# Patient Record
Sex: MALE | Race: WHITE | NOT HISPANIC OR LATINO | URBAN - METROPOLITAN AREA
[De-identification: names, ages, dates, MRNs, and addresses within clinical notes are randomized per-mention and may not be internally consistent; named-entity substitution may affect disease eponyms.]

---

## 2020-05-19 ENCOUNTER — INPATIENT (INPATIENT)
Facility: HOSPITAL | Age: 70
LOS: 2 days | Discharge: HOME | End: 2020-05-22
Attending: INTERNAL MEDICINE | Admitting: INTERNAL MEDICINE
Payer: MEDICARE

## 2020-05-19 VITALS
SYSTOLIC BLOOD PRESSURE: 121 MMHG | OXYGEN SATURATION: 100 % | TEMPERATURE: 98 F | DIASTOLIC BLOOD PRESSURE: 59 MMHG | RESPIRATION RATE: 18 BRPM | HEART RATE: 77 BPM

## 2020-05-19 LAB
ALBUMIN SERPL ELPH-MCNC: 4.2 G/DL — SIGNIFICANT CHANGE UP (ref 3.5–5.2)
ALP SERPL-CCNC: 61 U/L — SIGNIFICANT CHANGE UP (ref 30–115)
ALT FLD-CCNC: 8 U/L — SIGNIFICANT CHANGE UP (ref 0–41)
ANION GAP SERPL CALC-SCNC: 13 MMOL/L — SIGNIFICANT CHANGE UP (ref 7–14)
AST SERPL-CCNC: 14 U/L — SIGNIFICANT CHANGE UP (ref 0–41)
BASOPHILS # BLD AUTO: 0.06 K/UL — SIGNIFICANT CHANGE UP (ref 0–0.2)
BASOPHILS NFR BLD AUTO: 0.6 % — SIGNIFICANT CHANGE UP (ref 0–1)
BILIRUB SERPL-MCNC: 0.3 MG/DL — SIGNIFICANT CHANGE UP (ref 0.2–1.2)
BUN SERPL-MCNC: 31 MG/DL — HIGH (ref 10–20)
CALCIUM SERPL-MCNC: 9.1 MG/DL — SIGNIFICANT CHANGE UP (ref 8.5–10.1)
CHLORIDE SERPL-SCNC: 98 MMOL/L — SIGNIFICANT CHANGE UP (ref 98–110)
CO2 SERPL-SCNC: 26 MMOL/L — SIGNIFICANT CHANGE UP (ref 17–32)
CREAT SERPL-MCNC: 1.8 MG/DL — HIGH (ref 0.7–1.5)
D DIMER BLD IA.RAPID-MCNC: 211 NG/ML DDU — SIGNIFICANT CHANGE UP (ref 0–230)
EOSINOPHIL # BLD AUTO: 0.14 K/UL — SIGNIFICANT CHANGE UP (ref 0–0.7)
EOSINOPHIL NFR BLD AUTO: 1.5 % — SIGNIFICANT CHANGE UP (ref 0–8)
GLUCOSE SERPL-MCNC: 142 MG/DL — HIGH (ref 70–99)
HCT VFR BLD CALC: 33.8 % — LOW (ref 42–52)
HGB BLD-MCNC: 10.6 G/DL — LOW (ref 14–18)
IMM GRANULOCYTES NFR BLD AUTO: 0.3 % — SIGNIFICANT CHANGE UP (ref 0.1–0.3)
LACTATE SERPL-SCNC: 1.7 MMOL/L — SIGNIFICANT CHANGE UP (ref 0.7–2)
LIDOCAIN IGE QN: 39 U/L — SIGNIFICANT CHANGE UP (ref 7–60)
LYMPHOCYTES # BLD AUTO: 0.61 K/UL — LOW (ref 1.2–3.4)
LYMPHOCYTES # BLD AUTO: 6.3 % — LOW (ref 20.5–51.1)
MAGNESIUM SERPL-MCNC: 2.3 MG/DL — SIGNIFICANT CHANGE UP (ref 1.8–2.4)
MCHC RBC-ENTMCNC: 25.8 PG — LOW (ref 27–31)
MCHC RBC-ENTMCNC: 31.4 G/DL — LOW (ref 32–37)
MCV RBC AUTO: 82.2 FL — SIGNIFICANT CHANGE UP (ref 80–94)
MONOCYTES # BLD AUTO: 0.72 K/UL — HIGH (ref 0.1–0.6)
MONOCYTES NFR BLD AUTO: 7.5 % — SIGNIFICANT CHANGE UP (ref 1.7–9.3)
NEUTROPHILS # BLD AUTO: 8.07 K/UL — HIGH (ref 1.4–6.5)
NEUTROPHILS NFR BLD AUTO: 83.8 % — HIGH (ref 42.2–75.2)
NRBC # BLD: 0 /100 WBCS — SIGNIFICANT CHANGE UP (ref 0–0)
NT-PROBNP SERPL-SCNC: 1333 PG/ML — HIGH (ref 0–300)
PLATELET # BLD AUTO: 229 K/UL — SIGNIFICANT CHANGE UP (ref 130–400)
POTASSIUM SERPL-MCNC: 3.8 MMOL/L — SIGNIFICANT CHANGE UP (ref 3.5–5)
POTASSIUM SERPL-SCNC: 3.8 MMOL/L — SIGNIFICANT CHANGE UP (ref 3.5–5)
PROT SERPL-MCNC: 7.7 G/DL — SIGNIFICANT CHANGE UP (ref 6–8)
RBC # BLD: 4.11 M/UL — LOW (ref 4.7–6.1)
RBC # FLD: 15.5 % — HIGH (ref 11.5–14.5)
SARS-COV-2 RNA SPEC QL NAA+PROBE: DETECTED
SODIUM SERPL-SCNC: 137 MMOL/L — SIGNIFICANT CHANGE UP (ref 135–146)
TROPONIN T SERPL-MCNC: <0.01 NG/ML — SIGNIFICANT CHANGE UP
WBC # BLD: 9.63 K/UL — SIGNIFICANT CHANGE UP (ref 4.8–10.8)
WBC # FLD AUTO: 9.63 K/UL — SIGNIFICANT CHANGE UP (ref 4.8–10.8)

## 2020-05-19 PROCEDURE — 93289 INTERROG DEVICE EVAL HEART: CPT | Mod: 26

## 2020-05-19 PROCEDURE — 99223 1ST HOSP IP/OBS HIGH 75: CPT

## 2020-05-19 PROCEDURE — 71045 X-RAY EXAM CHEST 1 VIEW: CPT | Mod: 26

## 2020-05-19 PROCEDURE — 99285 EMERGENCY DEPT VISIT HI MDM: CPT

## 2020-05-19 PROCEDURE — 93010 ELECTROCARDIOGRAM REPORT: CPT

## 2020-05-19 RX ORDER — METOPROLOL TARTRATE 50 MG
25 TABLET ORAL DAILY
Refills: 0 | Status: DISCONTINUED | OUTPATIENT
Start: 2020-05-19 | End: 2020-05-22

## 2020-05-19 RX ORDER — METOPROLOL TARTRATE 50 MG
1 TABLET ORAL
Qty: 0 | Refills: 0 | DISCHARGE

## 2020-05-19 RX ORDER — RIVAROXABAN 15 MG-20MG
15 KIT ORAL DAILY
Refills: 0 | Status: DISCONTINUED | OUTPATIENT
Start: 2020-05-19 | End: 2020-05-20

## 2020-05-19 RX ORDER — ATORVASTATIN CALCIUM 80 MG/1
1 TABLET, FILM COATED ORAL
Qty: 0 | Refills: 0 | DISCHARGE

## 2020-05-19 RX ORDER — SACUBITRIL AND VALSARTAN 24; 26 MG/1; MG/1
1 TABLET, FILM COATED ORAL
Qty: 0 | Refills: 0 | DISCHARGE

## 2020-05-19 RX ORDER — SACUBITRIL AND VALSARTAN 24; 26 MG/1; MG/1
1 TABLET, FILM COATED ORAL
Refills: 0 | Status: DISCONTINUED | OUTPATIENT
Start: 2020-05-19 | End: 2020-05-22

## 2020-05-19 RX ORDER — SPIRONOLACTONE 25 MG/1
25 TABLET, FILM COATED ORAL DAILY
Refills: 0 | Status: DISCONTINUED | OUTPATIENT
Start: 2020-05-19 | End: 2020-05-22

## 2020-05-19 RX ORDER — SPIRONOLACTONE 25 MG/1
1 TABLET, FILM COATED ORAL
Qty: 0 | Refills: 0 | DISCHARGE

## 2020-05-19 RX ORDER — AMIODARONE HYDROCHLORIDE 400 MG/1
200 TABLET ORAL
Refills: 0 | Status: DISCONTINUED | OUTPATIENT
Start: 2020-05-19 | End: 2020-05-22

## 2020-05-19 RX ORDER — MEXILETINE HYDROCHLORIDE 150 MG/1
150 CAPSULE ORAL EVERY 8 HOURS
Refills: 0 | Status: DISCONTINUED | OUTPATIENT
Start: 2020-05-19 | End: 2020-05-22

## 2020-05-19 RX ORDER — ATORVASTATIN CALCIUM 80 MG/1
80 TABLET, FILM COATED ORAL AT BEDTIME
Refills: 0 | Status: DISCONTINUED | OUTPATIENT
Start: 2020-05-19 | End: 2020-05-22

## 2020-05-19 RX ORDER — FONDAPARINUX SODIUM 2.5 MG/.5ML
1 INJECTION, SOLUTION SUBCUTANEOUS
Qty: 0 | Refills: 0 | DISCHARGE

## 2020-05-19 RX ADMIN — SACUBITRIL AND VALSARTAN 1 TABLET(S): 24; 26 TABLET, FILM COATED ORAL at 17:24

## 2020-05-19 RX ADMIN — AMIODARONE HYDROCHLORIDE 200 MILLIGRAM(S): 400 TABLET ORAL at 17:24

## 2020-05-19 RX ADMIN — MEXILETINE HYDROCHLORIDE 150 MILLIGRAM(S): 150 CAPSULE ORAL at 14:06

## 2020-05-19 RX ADMIN — MEXILETINE HYDROCHLORIDE 150 MILLIGRAM(S): 150 CAPSULE ORAL at 22:14

## 2020-05-19 RX ADMIN — ATORVASTATIN CALCIUM 80 MILLIGRAM(S): 80 TABLET, FILM COATED ORAL at 22:14

## 2020-05-19 NOTE — H&P ADULT - HISTORY OF PRESENT ILLNESS
71 yo M with PMHx of 71 yo M with PMHx of MI (2005), CAD s/p PCI x2 (2005), defibrillator implantation in 2011 presents with chest discomfort this morning associated with lightheadedness. He is being worked up for some arrhythmia - unknown to patient-  by his EP doctor in [place]. His dosage of amiodarone was recently increased.    In ED, patient was hemodynamically stable, asymptomatic wanting to sign out AMA. Swabbed for COVID, was positive. Seen by EP. 69 yo M with PMHx of MI (2005), CAD s/p PCI x2 (2005), PAF on xarelto, defibrillator implantation in 2011 presents with chest discomfort this morning associated with lightheadedness. He is being worked up for some arrhythmia - unknown to patient-  by his EP doctor in [place]. His dosage of amiodarone was recently increased.    In ED, patient was hemodynamically stable, asymptomatic wanting to sign out AMA. Swabbed for COVID, was positive. Seen by EP - device interrogated, showed NSVT, sustained VT x 20 seconds.  After discussion with his primary cardiologist, patient decided to stay for further work-up. Will admit to tele with possible cath. 69 yo M with PMHx of MI (2005), CAD s/p PCI x2 (2005), PAF on xarelto, AICD implantation in 2011, ?CHF presents with chest discomfort this morning associated with lightheadedness. He is being worked up for some arrhythmia - unknown to patient-  by his EP doctor in [place]. His dosage of amiodarone was recently increased.    In ED, patient was hemodynamically stable, asymptomatic wanting to sign out AMA. Swabbed for COVID, was positive. Seen by EP - device interrogated, showed NSVT, sustained VT x 20 seconds.  After discussion with his primary cardiologist, patient decided to stay for further work-up. Will admit to tele with possible cath.

## 2020-05-19 NOTE — CONSULT NOTE ADULT - SUBJECTIVE AND OBJECTIVE BOX
Patient is a 70y old  Male who presents with a chief complaint of possible AICD shock    HPI: Pt is a 71 yo M with hx of CAD sp stent, AICD ([employer]), PAF on Xarelto came to ED with c/o "not feeling well". Pt reports that he felt "tension" in his chest with mild lightheadedness and sts this is similar to before he was shocked by ICD last January. Pts EP at [facility] and he recently increased the dosage of Amio last week.           PAST MEDICAL & SURGICAL HISTORY:  Pacemaker  HTN (hypertension)                  PREVIOUS DIAGNOSTIC TESTING:      ECHO  FINDINGS:    STRESS  FINDINGS:    CATHETERIZATION  FINDINGS:    ELECTROPHYSIOLOGY STUDY  FINDINGS:    CAROTID ULTRASOUND:  FINDINGS    VENOUS DUPLEX SCAN:  FINDINGS:    CHEST CT PULMONARY ANGIO with IV Contrast:  FINDINGS:    MEDICATIONS  (STANDING):    MEDICATIONS  (PRN):      FAMILY HISTORY:      SOCIAL HISTORY:    CIGARETTES:    ALCOHOL:    Past Surgical History:    Allergies:    Allergy Status Unknown      REVIEW OF SYSTEMS:    CONSTITUTIONAL: No fever, weight loss, chills, shakes, or fatigue  EYES: No eye pain, visual disturbances, or discharge  ENMT:  No difficulty hearing, tinnitus, vertigo; No sinus or throat pain  NECK: No pain or stiffness  BREASTS: No pain, masses, or nipple discharge  RESPIRATORY: No cough, wheezing, hemoptysis, or shortness of breath  CARDIOVASCULAR: No chest pain, dyspnea, palpitations, dizziness, syncope, paroxysmal nocturnal dyspnea, orthopnea, or arm or leg swelling  GASTROINTESTINAL: No abdominal  or epigastric pain, nausea, vomiting, hematemesis, diarrhea, constipation, melena or bright red blood.  GENITOURINARY: No dysuria, nocturia, hematuria, or urinary incontinence  NEUROLOGICAL: No headaches, memory loss, slurred speech, limb weakness, loss of strength, numbness, or tremors  SKIN: No itching, burning, rashes, or lesions   LYMPH NODES: No enlarged glands  ENDOCRINE: No heat or cold intolerance, or hair loss  MUSCULOSKELETAL: No joint pain or swelling, muscle, back, or extremity pain  PSYCHIATRIC: No depression, anxiety, or difficulty sleeping  HEME/LYMPH: No easy bruising or bleeding gums  ALLERY AND IMMUNOLOGIC: No hives or rash.      Vital Signs Last 24 Hrs  T(C): 36.7 (19 May 2020 07:48), Max: 36.7 (19 May 2020 07:48)  T(F): 98 (19 May 2020 07:48), Max: 98 (19 May 2020 07:48)  HR: 77 (19 May 2020 07:48) (77 - 77)  BP: 121/59 (19 May 2020 07:48) (121/59 - 121/59)  BP(mean): --  RR: 18 (19 May 2020 07:48) (18 - 18)  SpO2: 100% (19 May 2020 07:48) (100% - 100%)    PHYSICAL EXAM:        GENERAL: In no apparent distress, well nourished, and hydrated.  HEAD:  Atraumatic, Normocephalic  EYES: EOMI, PERRLA, conjunctiva and sclera clear  ENMT: No tonsillar erythema, exudates, or enlargements; ist mucous membranes, Good dentition, No lesions  NECK: Supple and normal thyroid.  No JVD or carotid bruit.  Carotid pulse is 2+ bilaterally.  HEART: Regular rate and rhythm; No murmurs, rubs, or gallops.  PULMONARY: Clear to auscultation and perfusion.  No rales, wheezing, or rhonchi bilaterally.  ABDOMEN: Soft, Nontender, Nondistended; Bowel sounds present  EXTREMITIES:  2+ Peripheral Pulses, No clubbing, cyanosis, or edema  LYMPH: No lymphadenopathy noted  NEUROLOGICAL: Grossly nonfocal      INTERPRETATION OF TELEMETRY:    ECG:    I&O's Detail      LABS:                        10.6   9.63  )-----------( 229      ( 19 May 2020 08:20 )             33.8     05-19    137  |  98  |  31<H>  ----------------------------<  142<H>  3.8   |  26  |  1.8<H>    Ca    9.1      19 May 2020 08:20  Mg     2.3     05-19    TPro  7.7  /  Alb  4.2  /  TBili  0.3  /  DBili  x   /  AST  14  /  ALT  8   /  AlkPhos  61  05-19    CARDIAC MARKERS ( 19 May 2020 08:20 )  x     / <0.01 ng/mL / x     / x     / x              BNPSerum Pro-Brain Natriuretic Peptide: 1333 pg/mL (05-19 @ 08:20)    I&O's Detail    Daily     Daily     RADIOLOGY & ADDITIONAL STUDIES: Patient is a 70y old  Male who presents with a chief complaint of possible AICD shock    HPI: Pt is a 71 yo M with hx of CAD sp stent, AICD ([employer]), PAF on Xarelto came to ED with c/o "not feeling well". Pt reports that he felt "tension" in his chest with mild lightheadedness and sts this is similar to before he was shocked by ICD last January. Pts EP at [facility] and he recently increased the dosage of Amio last week. Pt did not get shocked by ICD. Pt now feeling better. Denies CP, palpitations, dizziness, SOB or syncope.    PAST MEDICAL & SURGICAL HISTORY:  Pacemaker  HTN (hypertension)      PREVIOUS DIAGNOSTIC TESTING:      ECHO  FINDINGS:    STRESS  FINDINGS:    CATHETERIZATION  FINDINGS:    ELECTROPHYSIOLOGY STUDY  FINDINGS:    CAROTID ULTRASOUND:  FINDINGS    VENOUS DUPLEX SCAN:  FINDINGS:    CHEST CT PULMONARY ANGIO with IV Contrast:  FINDINGS:    MEDICATIONS  (STANDING):    FAMILY HISTORY: No significant family hx    SOCIAL HISTORY: No smoking, ETOH or illicit drug use    Past Surgical History: AICD Implant    Allergies:  Allergy Status Unknown      REVIEW OF SYSTEMS:  CONSTITUTIONAL: No fever, weight loss, chills, shakes, or fatigue  RESPIRATORY: No cough, wheezing, hemoptysis, or shortness of breath  CARDIOVASCULAR: No chest pain, dyspnea, palpitations, dizziness, syncope, paroxysmal nocturnal dyspnea, orthopnea, or arm or leg swelling  GASTROINTESTINAL: No abdominal  or epigastric pain, nausea, vomiting, hematemesis, diarrhea, constipation, melena or bright red blood.  NEUROLOGICAL: No headaches, memory loss, slurred speech, limb weakness, loss of strength, numbness, or tremors  MUSCULOSKELETAL: No joint pain or swelling, muscle, back, or extremity pain      Vital Signs Last 24 Hrs  T(C): 36.7 (19 May 2020 07:48), Max: 36.7 (19 May 2020 07:48)  T(F): 98 (19 May 2020 07:48), Max: 98 (19 May 2020 07:48)  HR: 77 (19 May 2020 07:48) (77 - 77)  BP: 121/59 (19 May 2020 07:48) (121/59 - 121/59)  BP(mean): --  RR: 18 (19 May 2020 07:48) (18 - 18)  SpO2: 100% (19 May 2020 07:48) (100% - 100%)    PHYSICAL EXAM:  GENERAL: In no apparent distress, well nourished, and hydrated.  HEAD:  Atraumatic, Normocephalic  EYES: EOMI, PERRLA, conjunctiva and sclera clear  ENMT: No tonsillar erythema, exudates, or enlargements; Moist mucous membranes, Good dentition, No lesions  NECK: Supple and normal thyroid.  No JVD or carotid bruit.  Carotid pulse is 2+ bilaterally.  HEART: Regular rate and rhythm; No murmurs, rubs, or gallops.  PULMONARY: Clear to auscultation and perfusion.  No rales, wheezing, or rhonchi bilaterally.  ABDOMEN: Soft, Nontender, Nondistended; Bowel sounds present  EXTREMITIES:  2+ Peripheral Pulses, No clubbing, cyanosis, or edema  NEUROLOGICAL: Grossly nonfocal      INTERPRETATION OF TELEMETRY: Paced Rhythm 68 bpm    ECG:  < from: 12 Lead ECG (05.19.20 @ 07:53) >  Ventricular Rate 70 BPM    Atrial Rate 36 BPM    QRS Duration 208 ms    Q-T Interval 510 ms    QTC Calculation(Bezet) 550 ms    R Axis -59 degrees    T Axis 107 degrees    Diagnosis Line AV dual-paced rhythm  Abnormal ECG    Confirmed by DELMAR DELA CRUZ MD ([id number]) on 5/19/2020 10:19:32 AM          I&O's Detail      LABS:                        10.6   9.63  )-----------( 229      ( 19 May 2020 08:20 )             33.8     05-19    137  |  98  |  31<H>  ----------------------------<  142<H>  3.8   |  26  |  1.8<H>    Ca    9.1      19 May 2020 08:20  Mg     2.3     05-19    TPro  7.7  /  Alb  4.2  /  TBili  0.3  /  DBili  x   /  AST  14  /  ALT  8   /  AlkPhos  61  05-19    CARDIAC MARKERS ( 19 May 2020 08:20 )  x     / <0.01 ng/mL / x     / x     / x              BNPSerum Pro-Brain Natriuretic Peptide: 1333 pg/mL (05-19 @ 08:20)    I&O's Detail    Daily     Daily     RADIOLOGY & ADDITIONAL STUDIES: Patient is a 70y old  Male who presents with a chief complaint of possible AICD shock    HPI: Pt is a 69 yo M with hx of CAD sp stent, AICD ([employer]), PAF on Xarelto came to ED with c/o "not feeling well". Pt reports that he felt "tension" in his chest with mild lightheadedness and sts this is similar to before he was shocked by ICD last January. Pts EP at [facility] and he recently increased the dosage of Amio last week. Pt did not get shocked by ICD. Pt now feeling better. Denies CP, palpitations, dizziness, SOB or syncope.    PAST MEDICAL & SURGICAL HISTORY:  ICD  HTN (hypertension)    PREVIOUS DIAGNOSTIC TESTING:    Cath 2 years ago with PCI to LAD at [facility]    MEDICATIONS  (STANDING):    FAMILY HISTORY: No significant family hx    SOCIAL HISTORY: No smoking, ETOH or illicit drug use    Past Surgical History: AICD Implant    Allergies:  Allergy Status Unknown      REVIEW OF SYSTEMS:  CONSTITUTIONAL: No fever, weight loss, chills, shakes, or fatigue  RESPIRATORY: No cough, wheezing, hemoptysis, or shortness of breath  CARDIOVASCULAR: No chest pain, dyspnea, palpitations, dizziness, syncope, paroxysmal nocturnal dyspnea, orthopnea, or arm or leg swelling  GASTROINTESTINAL: No abdominal  or epigastric pain, nausea, vomiting, hematemesis, diarrhea, constipation, melena or bright red blood.  NEUROLOGICAL: No headaches, memory loss, slurred speech, limb weakness, loss of strength, numbness, or tremors  MUSCULOSKELETAL: No joint pain or swelling, muscle, back, or extremity pain      Vital Signs Last 24 Hrs  T(C): 36.7 (19 May 2020 07:48), Max: 36.7 (19 May 2020 07:48)  T(F): 98 (19 May 2020 07:48), Max: 98 (19 May 2020 07:48)  HR: 77 (19 May 2020 07:48) (77 - 77)  BP: 121/59 (19 May 2020 07:48) (121/59 - 121/59)  BP(mean): --  RR: 18 (19 May 2020 07:48) (18 - 18)  SpO2: 100% (19 May 2020 07:48) (100% - 100%)    PHYSICAL EXAM:  GENERAL: In no apparent distress, well nourished, and hydrated.  HEAD:  Atraumatic, Normocephalic  EYES: EOMI, PERRLA, conjunctiva and sclera clear  ENMT: Moist mucous membranes, Good dentition, No lesions  NECK: Supple and normal thyroid.  No JVD  HEART: Regular rate and rhythm; No murmurs, rubs, or gallops.  PULMONARY: Clear to auscultation and perfusion.  No rales, wheezing, or rhonchi bilaterally.  ABDOMEN: Soft, Nontender, Nondistended; Bowel sounds present  EXTREMITIES:  2+ Peripheral Pulses, No clubbing, cyanosis, or edema  NEUROLOGICAL: Grossly nonfocal      INTERPRETATION OF TELEMETRY: Paced Rhythm 68 bpm    ECG:  < from: 12 Lead ECG (05.19.20 @ 07:53) >  Ventricular Rate 70 BPM    Atrial Rate 36 BPM    QRS Duration 208 ms    Q-T Interval 510 ms    QTC Calculation(Bezet) 550 ms    R Axis -59 degrees    T Axis 107 degrees    Diagnosis Line AV dual-paced rhythm  Abnormal ECG    Confirmed by DELMAR DELA CRUZ MD [id number]) on 5/19/2020 10:19:32 AM      I&O's Detail      LABS:                        10.6   9.63  )-----------( 229      ( 19 May 2020 08:20 )             33.8     05-19    137  |  98  |  31<H>  ----------------------------<  142<H>  3.8   |  26  |  1.8<H>    Ca    9.1      19 May 2020 08:20  Mg     2.3     05-19    TPro  7.7  /  Alb  4.2  /  TBili  0.3  /  DBili  x   /  AST  14  /  ALT  8   /  AlkPhos  61  05-19    CARDIAC MARKERS ( 19 May 2020 08:20 )  x     / <0.01 ng/mL / x     / x     / x              BNPSerum Pro-Brain Natriuretic Peptide: 1333 pg/mL (05-19 @ 08:20)    I&O's Detail    Daily     Daily     RADIOLOGY & ADDITIONAL STUDIES:

## 2020-05-19 NOTE — CONSULT NOTE ADULT - ATTENDING COMMENTS
EP: Dr. Schmidt    69 yo M with history of CAD s/p PCI (LAD 2 years ago), BiV ICD, paroxysmal AFib on Xarelto and Amio. Pt with recent episodes of VT but has been refusing ablation. He presented to the ED c/o "not feeling well." Interrogation revealed 2 episodes of NSVT (17 sec and 20 sec). He received ATP for one of the episodes. Pt denied chest pain.     Recommend  - Cont Amio. Rec starting BB and Mexiletine  - Monitor on tele  - Ischemic work up per cardiology  - Rec checking 2D echo and trending troponins  - ID consult for asymptomatic COVID + positive  - Monitor lytes and keep K>4 and Mg>2

## 2020-05-19 NOTE — ED ADULT NURSE NOTE - OBJECTIVE STATEMENT
Patient c/o pacemaker malfunction, patient states he felt the sensation of his pacemaker about to go off, patient had palpitations. Patient states after episode he feels better, denies nausea / vomiting /palpitations/ SOB/diaphoresis at this time. On assessment no s/s of distress noted. CM in place.

## 2020-05-19 NOTE — H&P ADULT - NSHPLABSRESULTS_GEN_ALL_CORE
LABS:                          10.6   9.63  )-----------( 229      ( 19 May 2020 08:20 )             33.8     05-19    137  |  98  |  31<H>  ----------------------------<  142<H>  3.8   |  26  |  1.8<H>    Ca    9.1      19 May 2020 08:20  Mg     2.3     05-19    TPro  7.7  /  Alb  4.2  /  TBili  0.3  /  DBili  x   /  AST  14  /  ALT  8   /  AlkPhos  61  05-19        < from: Xray Chest 1 View- PORTABLE-Urgent (05.19.20 @ 08:39) >    Impression:    No radiographic evidence of acute cardiopulmonary disease.  Mild bibasilar atelectasis     < end of copied text >

## 2020-05-19 NOTE — ED PROVIDER NOTE - NS ED ROS FT
Constitutional: (-) fever  Eyes/ENT: (-) blurry vision, (-) epistaxis  Cardiovascular: +) chest pain, (-) syncope  Respiratory: (-) cough, (-) shortness of breath  Gastrointestinal: (-) vomiting, (-) diarrhea  Musculoskeletal: (-) neck pain, (-) back pain, (-) joint pain  Integumentary: (-) rash, (-) edema  Neurological: (-) headache, (-) altered mental status  Psychiatric: (-) hallucinations  Allergic/Immunologic: (-) pruritus

## 2020-05-19 NOTE — ED PROVIDER NOTE - CARE PLAN
Principal Discharge DX:	Defibrillator discharge  Secondary Diagnosis:	SVT (supraventricular tachycardia)

## 2020-05-19 NOTE — H&P ADULT - NSHPPHYSICALEXAM_GEN_ALL_CORE
PHYSICAL EXAM:    GENERAL: NAD, lying in bed comfortably  HEAD:  Atraumatic, Normocephalic  EYES: EOMI, PERRLA, conjunctiva and sclera clear  ENT: Moist mucous membranes  NECK: Supple, No JVD  CHEST/LUNG: Clear to auscultation bilaterally; No rales, rhonchi, wheezing, or rubs. Unlabored respirations  HEART: Regular rate and rhythm; No murmurs, rubs, or gallops  ABDOMEN: Bowel sounds present; Soft, Nontender, Nondistended. No hepatomegally  EXTREMITIES:  2+ Peripheral Pulses, brisk capillary refill. No clubbing, cyanosis, or edema  NERVOUS SYSTEM:  Alert & Oriented X3, speech clear. No deficits   MSK: FROM all 4 extremities, full and equal strength  SKIN: No rashes or lesions PHYSICAL EXAM:    GENERAL: NAD, sitting up in bed   HEAD:  Atraumatic, Normocephalic  EYES: EOMI, PERRLA, conjunctiva and sclera clear  ENT: Moist mucous membranes  NECK: Supple, No JVD  CHEST/LUNG: Clear to auscultation bilaterally; Unlabored respirations  HEART: Regular rate and rhythm; No murmurs, rubs, or gallops  ABDOMEN: Bowel sounds present; Soft, Nontender, Nondistended  EXTREMITIES:  2+ Peripheral Pulses, brisk capillary refill. No edema  NERVOUS SYSTEM:  Alert & Oriented X3, speech clear. No deficits

## 2020-05-19 NOTE — ED PROVIDER NOTE - OBJECTIVE STATEMENT
69y/o M w/ hx of HTN, CHF, defibrillator for the past one being worked up for some "arrhthymias" by dr. gallegos in [place] presents for chest discomfort this morning followed by a shock of his defibrillator.    [employer] defibrillator  reached to dr. owen who was aware of this pt by pts cardio.  wants admission to cath.  pt denies cp now, no sob, no vomiting or diarrhea. no cough, congeisotn, runny nose.

## 2020-05-19 NOTE — H&P ADULT - ASSESSMENT
#COVID-19 positive  -CXR - bibasilar atelectasis, encourage incentive spirometry   -no signs of respiratory distress  -saturating well on room air   -supportive care, keep O2 sat 92-96%  -f/u inflammatory markers (procalcitonin, ferritin, CRP, D-dimer)     GI ppx: not indicated   DVT ppx: heparin subq     Activity: increase as tolerated 71 yo M with PMHx of MI (2005), CAD s/p PCI x2 (2005), defibrillator implantation in 2011 presents with chest discomfort this morning associated with lightheadedness.     #NSVT, sustained VT x 20 seconds  - Admit to tele   - Cont Amio 200 mg Q12h  - Start Mexiletine 150mg Q8h & lopressor 25 mg QD  - f/u 2D Echo  - Cardiology consult for ischemic workup  - Daily EKG to assess QTc  - Monitor electrolytes, maintain WNL  - EP following     #COVID-19 positive, asymptomatic   -CXR - bibasilar atelectasis, encourage incentive spirometry   -no signs of respiratory distress  -saturating well on room air   -supportive care, keep O2 sat 92-96%  -f/u inflammatory markers (procalcitonin, ferritin, CRP, D-dimer)     GI ppx: not indicated   DVT ppx: heparin subq     Activity: increase as tolerated 69 yo M with PMHx of MI (2005), CAD s/p PCI x2 (2005), defibrillator implantation in 2011 presents with chest discomfort this morning associated with lightheadedness.     #Chest discomfort 2/2 NSVT, sustained VT x 20 seconds   - EP interrogated device - NSVT, sustained VT x20 seconds   - Admit to tele   - Cont Amio 200 mg Q12h (patient was supposed to go from amio 200 BID to 200 QD on 5/20)  - Start Mexiletine 150mg Q8h   - f/u 2D Echo to access for HF   - f/u cardio consult for ischemic workup  - Daily EKG to assess QTc - AM EKG ordered   - Monitor electrolytes - Mg >2, K >4   - EP following     #Paroxysmal Afib, rate-controlled   -continue xarelto, metoprolol    #CAD s/p PCIx2   - continue xarelto, atorvastatin, BB, entresto     #COVID-19 positive, asymptomatic   -CXR - bibasilar atelectasis, encourage incentive spirometry   -no signs of respiratory distress  -saturating well on room air   -supportive care, keep O2 sat 92-96%  -f/u inflammatory markers (procalcitonin, ferritin, CRP, D-dimer)     GI ppx: not indicated   DVT ppx: xarelto     Activity: increase as tolerated   Diet: DASH/TLC   Dispo: Home 71 yo M with PMHx of MI (2005), CAD s/p PCI x2 (2005), defibrillator implantation in 2011 presents with chest discomfort this morning associated with lightheadedness.     #Chest discomfort 2/2 NSVT, sustained VT x 20 seconds   - EP interrogated device - NSVT, sustained VT x20 seconds   - Admit to tele   - Cont Amio 200 mg Q12h (patient was supposed to go from amio 200 BID to 200 QD on 5/20)  - Start Mexiletine 150mg Q8h   - f/u 2D Echo to access for HF   - f/u cardio consult for ischemic workup  - Daily EKG to assess QTc - AM EKG ordered   - Monitor electrolytes - Mg >2, K >4   - EP following     #?HF unknown if reserved or preserved EF   -no echo in chart, f/u repeat 2D echo   -continue entresto, spironolactone torsemide, metoprolol     #Paroxysmal Afib, rate-controlled   -continue xarelto, metoprolol    #CAD s/p PCIx2   - continue xarelto, atorvastatin, BB, entresto     #COVID-19 positive, asymptomatic   -CXR - bibasilar atelectasis, encourage incentive spirometry   -no signs of respiratory distress  -saturating well on room air   -supportive care, keep O2 sat 92-96%  -f/u inflammatory markers (procalcitonin, ferritin, CRP, D-dimer)     GI ppx: not indicated   DVT ppx: xarelto     Activity: increase as tolerated   Diet: DASH/TLC   Dispo: Home

## 2020-05-19 NOTE — H&P ADULT - ATTENDING COMMENTS
Patient seen and examined independently. Agree with resident note/ history / physical exam and plan of care with following exceptions/additions/updates. Case discussed with patient/pt decision maker, house-staff and nursing    pt is feeling ok now.   dw EP, pt needs ischemic workup per EP. will follow up cardiology consult.

## 2020-05-19 NOTE — ED ADULT TRIAGE NOTE - CHIEF COMPLAINT QUOTE
Pt states he felt his pacemaker/defibrillator was going to go off, stating he felt palpitations and mild chest pain, but then he felt better.

## 2020-05-19 NOTE — ED PROVIDER NOTE - PHYSICAL EXAMINATION
VITAL SIGNS: I have reviewed nursing notes and confirm.  CONSTITUTIONAL: Well-developed; well-nourished; in no acute distress. pt comfortable.  SKIN: skin exam is warm and dry, no acute rash.   HEAD: Normocephalic; atraumatic.  EYES:  EOM intact; conjunctiva and sclera clear.  ENT: No nasal discharge; airway clear. moist oral mucosa;   NECK: Supple; non tender.  CARD: S1, S2 normal; no murmurs, gallops, or rubs. Regular rate and rhythm. posterior tibial and radial pulses 2+  RESP: No wheezes, rales or rhonchi. cta b/l. no use of accessory muscles. no retractions  ABD: Normal bowel sounds; soft; non-distended; non-tender; no rebound. negative psoas, rovsign's and murphys.  EXT: Normal ROM. No  cyanosis or edema.  BACK: No cva tenderness  NEURO: Alert, oriented, grossly unremarkable.    PSYCH: Cooperative, appropriate.

## 2020-05-19 NOTE — ED ADULT NURSE NOTE - CHPI ED NUR SYMPTOMS NEG
no fever/no congestion/no syncope/no shortness of breath/no chest pain/no back pain/no chills/no vomiting/no dizziness/no diaphoresis

## 2020-05-19 NOTE — ED PROVIDER NOTE - CLINICAL SUMMARY MEDICAL DECISION MAKING FREE TEXT BOX
Interrogated by EP. Had two episodes of non-sustained VT terminated with overdrive pacing. Will continue amiodarone. Electrolytes WNL. Tn negative. Will admit to tele.  Rapid covid +. no symptoms. Will isolate.

## 2020-05-20 LAB
ANION GAP SERPL CALC-SCNC: 12 MMOL/L — SIGNIFICANT CHANGE UP (ref 7–14)
BUN SERPL-MCNC: 26 MG/DL — HIGH (ref 10–20)
CALCIUM SERPL-MCNC: 9.2 MG/DL — SIGNIFICANT CHANGE UP (ref 8.5–10.1)
CHLORIDE SERPL-SCNC: 101 MMOL/L — SIGNIFICANT CHANGE UP (ref 98–110)
CO2 SERPL-SCNC: 27 MMOL/L — SIGNIFICANT CHANGE UP (ref 17–32)
CREAT SERPL-MCNC: 1.8 MG/DL — HIGH (ref 0.7–1.5)
CRP SERPL-MCNC: 0.87 MG/DL — HIGH (ref 0–0.4)
GLUCOSE SERPL-MCNC: 99 MG/DL — SIGNIFICANT CHANGE UP (ref 70–99)
HCV AB S/CO SERPL IA: 0.05 COI — SIGNIFICANT CHANGE UP
HCV AB SERPL-IMP: SIGNIFICANT CHANGE UP
MAGNESIUM SERPL-MCNC: 2.5 MG/DL — HIGH (ref 1.8–2.4)
POTASSIUM SERPL-MCNC: 4.5 MMOL/L — SIGNIFICANT CHANGE UP (ref 3.5–5)
POTASSIUM SERPL-SCNC: 4.5 MMOL/L — SIGNIFICANT CHANGE UP (ref 3.5–5)
PROCALCITONIN SERPL-MCNC: 0.06 NG/ML — SIGNIFICANT CHANGE UP (ref 0.02–0.1)
SODIUM SERPL-SCNC: 140 MMOL/L — SIGNIFICANT CHANGE UP (ref 135–146)

## 2020-05-20 PROCEDURE — 93010 ELECTROCARDIOGRAM REPORT: CPT

## 2020-05-20 PROCEDURE — 99233 SBSQ HOSP IP/OBS HIGH 50: CPT

## 2020-05-20 RX ORDER — SODIUM CHLORIDE 9 MG/ML
1000 INJECTION INTRAMUSCULAR; INTRAVENOUS; SUBCUTANEOUS
Refills: 0 | Status: DISCONTINUED | OUTPATIENT
Start: 2020-05-20 | End: 2020-05-21

## 2020-05-20 RX ORDER — CLOPIDOGREL BISULFATE 75 MG/1
75 TABLET, FILM COATED ORAL DAILY
Refills: 0 | Status: DISCONTINUED | OUTPATIENT
Start: 2020-05-21 | End: 2020-05-22

## 2020-05-20 RX ORDER — AMIODARONE HYDROCHLORIDE 400 MG/1
1 TABLET ORAL
Qty: 0 | Refills: 0 | DISCHARGE
Start: 2020-05-20

## 2020-05-20 RX ORDER — ASPIRIN/CALCIUM CARB/MAGNESIUM 324 MG
81 TABLET ORAL DAILY
Refills: 0 | Status: DISCONTINUED | OUTPATIENT
Start: 2020-05-20 | End: 2020-05-22

## 2020-05-20 RX ORDER — CHLORHEXIDINE GLUCONATE 213 G/1000ML
1 SOLUTION TOPICAL
Refills: 0 | Status: DISCONTINUED | OUTPATIENT
Start: 2020-05-20 | End: 2020-05-22

## 2020-05-20 RX ORDER — AMIODARONE HYDROCHLORIDE 400 MG/1
1 TABLET ORAL
Qty: 0 | Refills: 0 | DISCHARGE

## 2020-05-20 RX ADMIN — SACUBITRIL AND VALSARTAN 1 TABLET(S): 24; 26 TABLET, FILM COATED ORAL at 05:43

## 2020-05-20 RX ADMIN — ATORVASTATIN CALCIUM 80 MILLIGRAM(S): 80 TABLET, FILM COATED ORAL at 21:14

## 2020-05-20 RX ADMIN — Medication 81 MILLIGRAM(S): at 14:06

## 2020-05-20 RX ADMIN — MEXILETINE HYDROCHLORIDE 150 MILLIGRAM(S): 150 CAPSULE ORAL at 05:43

## 2020-05-20 RX ADMIN — SODIUM CHLORIDE 75 MILLILITER(S): 9 INJECTION INTRAMUSCULAR; INTRAVENOUS; SUBCUTANEOUS at 21:14

## 2020-05-20 RX ADMIN — MEXILETINE HYDROCHLORIDE 150 MILLIGRAM(S): 150 CAPSULE ORAL at 14:07

## 2020-05-20 RX ADMIN — Medication 25 MILLIGRAM(S): at 05:43

## 2020-05-20 RX ADMIN — AMIODARONE HYDROCHLORIDE 200 MILLIGRAM(S): 400 TABLET ORAL at 17:46

## 2020-05-20 RX ADMIN — SACUBITRIL AND VALSARTAN 1 TABLET(S): 24; 26 TABLET, FILM COATED ORAL at 17:46

## 2020-05-20 RX ADMIN — AMIODARONE HYDROCHLORIDE 200 MILLIGRAM(S): 400 TABLET ORAL at 05:43

## 2020-05-20 RX ADMIN — SPIRONOLACTONE 25 MILLIGRAM(S): 25 TABLET, FILM COATED ORAL at 05:43

## 2020-05-20 RX ADMIN — Medication 100 MILLIGRAM(S): at 05:43

## 2020-05-20 RX ADMIN — MEXILETINE HYDROCHLORIDE 150 MILLIGRAM(S): 150 CAPSULE ORAL at 21:14

## 2020-05-20 NOTE — CONSULT NOTE ADULT - ASSESSMENT
69 yo M with PMHx of MI (2005), CAD s/p PCI x2 (2005), PAF on xarelto, AICD implantation in 2011, ?CHF presents with chest discomfort associated with lightheadedness. pt was found to have NSVT on device interrogation. Cardiology was consulted for ischemic work up. Pt was found to be COVID +ve but he denies any pertinent symptoms recently.     NSVT x 2 (one of them terminated by ATP)  denies any anginal symptoms at baseline   elevated Cr- ? ROSALEE and CKD   COVID +ve- asymptomatic   h/o CAD s/p PCI 15 yrs ago   h/o Afib on Xarelto, BIV ICD    Plan:  start ASA  cont statin BB, entresto,  diuretics   obtain baseline Cr level from PMD  hold Xarelto for now   f/u ID eval    will d/w attending regarding cath
Assessment: 69 yo M with CAD sp stent and AICD admitted for lightheadedness and not feeling well. Pt reports feeling sx similar to BEFORE his AICD had shocked him but was never shocked. Pt now asymptomatic    Impression:  NSVT  Sustained VT on Interrogation x 20 sec (terminated by ATP)  CAD sp Stent  Covid +    Plan:  - Admit to tele  - Cont Amio 200 mg Q12h  - Start Mexiletine 150mg Q8h  - Start Lopressor 25 mg QD  - 2D Echo  - Cardiology consult for ischemic workup  - Daily EKG to assess QTc  - Cont tele monitoring  - Treat COVID as per ID  - Monitor electrolytes, maintain WNL  - Will cont to follow

## 2020-05-20 NOTE — CHART NOTE - NSCHARTNOTEFT_GEN_A_CORE
PRE-OP DIAGNOSIS: monomorphic VTACH    PROCEDURE: [facility] with coronary angiography    Physician: Megha  Assistant: Meir Davila    ANESTHESIA TYPE:  [  ]General Anesthesia  [ x ] Sedation  [x  ] Local/Regional    ESTIMATED BLOOD LOSS:    10   mL    CONDITION  [  ] Critical  [  ] Serious  [  ]Fair  [x  ]Good      SPECIMENS REMOVED (IF APPLICABLE): N/A      IV CONTRAST:    200         mL      IMPLANTS (IF APPLICABLE)      FINDINGS    Left Heart Catheterization:  LVEF%:  LVEDP: minimal elevation  [ ] Normal Coronary Arteries  [ ] Luminal Irregularities  [ ] Non-obstructive CAD      LEFT HEART CATHETERIZATION                                    Left main normal    LAD:    mid 70% . proximal mild in stent restenosis. iFR 0.88                    Diag:    Left Circumflex: mild diffuse disease  OM:    Right Coronary Artery: mid 80% lesion.   RPDA  RPL    Ramus Intermed:    DOMINANCE: Right    ACCESS: r radial  CLOSURE: d stat    INTERVENTION  IMPLANTS: ANYA x2 in mRCA, ANYA x1 in mLAD    POST-OP DIAGNOSIS: VTACH, CAD          PLAN OF CARE  [ ] D/C Home today  [ ]  D/C in AM  [x ] Return to In-patient bed  [ ] Admit for observation  [ ] Return for staged procedure:  [ ] CT Surgery consult called  [x ]  Continue DAPT, B-blocker & Statin therapy    Results of procedure/ plan of care discussed with patient/  in detail. PRE-OP DIAGNOSIS: monomorphic VTACH    PROCEDURE: [facility] with coronary angiography    Physician: Megha  Assistant: Meir Davila    ANESTHESIA TYPE:  [  ]General Anesthesia  [ x ] Sedation  [x  ] Local/Regional    ESTIMATED BLOOD LOSS:    10   mL    CONDITION  [  ] Critical  [  ] Serious  [  ]Fair  [x  ]Good      SPECIMENS REMOVED (IF APPLICABLE): N/A      IV CONTRAST:    200         mL      IMPLANTS (IF APPLICABLE)      FINDINGS    Left Heart Catheterization:  LVEF%:  LVEDP: minimal elevation  [ ] Normal Coronary Arteries  [ ] Luminal Irregularities  [ ] Non-obstructive CAD      LEFT HEART CATHETERIZATION                                    Left main normal    LAD:    mid 70% . proximal mild in stent restenosis. iFR 0.88                    Diag:    Left Circumflex: mild diffuse disease  OM:    Right Coronary Artery: mid 80% lesion.   RPDA  RPL    Ramus Intermed:    DOMINANCE: Right    ACCESS: r radial  CLOSURE: d stat    INTERVENTION  IMPLANTS: ANYA x2 in mRCA, ANYA x1 in mLAD    POST-OP DIAGNOSIS: VTACH, CAD    Continus aspirin and xarelto.   add plavix.  In one week discontinue aspirin and continue plavix and xarelto indefinitely.       PLAN OF CARE  [ ] D/C Home today  [ ]  D/C in AM  [x ] Return to In-patient bed  [ ] Admit for observation  [ ] Return for staged procedure:  [ ] CT Surgery consult called  [x ]  Continue DAPT, B-blocker & Statin therapy    Results of procedure/ plan of care discussed with patient/  in detail.

## 2020-05-20 NOTE — CONSULT NOTE ADULT - SUBJECTIVE AND OBJECTIVE BOX
Patient is a 70y old  Male who presents with a chief complaint of pacemaker malfunction (20 May 2020 08:40)    HPI:  69 yo M with PMHx of MI (2005), CAD s/p PCI x2 (2005), PAF on xarelto, AICD implantation in 2011, ?CHF presents with chest discomfort this morning associated with lightheadedness. He is being worked up for some arrhythmia - unknown to patient-  by his EP doctor in [place]. His dosage of amiodarone was recently increased.    In ED, patient was hemodynamically stable, asymptomatic wanting to sign out AMA. Swabbed for COVID, was positive. Seen by EP - device interrogated, showed NSVT, sustained VT x 20 seconds.  After discussion with his primary cardiologist, patient decided to stay for further work-up. Will admit to tele with possible cath. (19 May 2020 10:38)      ROS:  All other systems reviewed and are negative    PAST MEDICAL & SURGICAL HISTORY  Pacemaker  HTN (hypertension)      FAMILY HISTORY:  FAMILY HISTORY:  NC    SOCIAL HISTORY:  [-]smoker  []Alcohol  []Drug    ALLERGIES:  Allergy Status Unknown      MEDICATIONS:  MEDICATIONS  (STANDING):  aMIOdarone    Tablet 200 milliGRAM(s) Oral two times a day  atorvastatin 80 milliGRAM(s) Oral at bedtime  chlorhexidine 4% Liquid 1 Application(s) Topical <User Schedule>  metoprolol succinate ER 25 milliGRAM(s) Oral daily  mexiletine 150 milliGRAM(s) Oral every 8 hours  sacubitril 24 mG/valsartan 26 mG 1 Tablet(s) Oral two times a day  spironolactone 25 milliGRAM(s) Oral daily  torsemide 100 milliGRAM(s) Oral daily    MEDICATIONS  (PRN):      HOME MEDICATIONS:  Home Medications:  amiodarone 200 mg oral tablet: 1 tab(s) orally once a day (19 May 2020 11:45)  amiodarone 200 mg oral tablet: 1 tab(s) orally 2 times a day (19 May 2020 11:45)  atorvastatin 80 mg oral tablet: 1 tab(s) orally once a day (19 May 2020 11:46)  Entresto 24 mg-26 mg oral tablet: 1 tab(s) orally 2 times a day (19 May 2020 11:47)  metoprolol succinate 25 mg oral capsule, extended release: 1 cap(s) orally once a day (19 May 2020 11:46)  spironolactone 25 mg oral tablet: 1 tab(s) orally once a day (19 May 2020 11:46)  torsemide 100 mg oral tablet: 1 tab(s) orally once a day (19 May 2020 11:46)  Xarelto 15 mg oral tablet: 1 tab(s) orally once a day (in the evening) (19 May 2020 11:46)      VITALS:   T(F): 96.8 (05-20 @ 04:49), Max: 98.1 (05-19 @ 21:15)  HR: 70 (05-20 @ 04:49) (58 - 77)  BP: 104/58 (05-20 @ 04:49) (102/59 - 127/58)  BP(mean): --  RR: 18 (05-20 @ 04:49) (18 - 18)  SpO2: 98% (05-19 @ 21:24) (98% - 100%)    I&O's Summary    19 May 2020 07:01  -  20 May 2020 07:00  --------------------------------------------------------  IN: 200 mL / OUT: 1925 mL / NET: -1725 mL    20 May 2020 07:01  -  20 May 2020 11:31  --------------------------------------------------------  IN: 330 mL / OUT: 1300 mL / NET: -970 mL      LABS:                        10.6   9.63  )-----------( 229      ( 19 May 2020 08:20 )             33.8     05-19    137  |  98  |  31<H>  ----------------------------<  142<H>  3.8   |  26  |  1.8<H>    Ca    9.1      19 May 2020 08:20  Mg     2.3     05-19    TPro  7.7  /  Alb  4.2  /  TBili  0.3  /  DBili  x   /  AST  14  /  ALT  8   /  AlkPhos  61  05-19        CARDIAC MARKERS ( 19 May 2020 08:20 )  x     / <0.01 ng/mL / x     / x     / x            Troponin trend:    Serum Pro-Brain Natriuretic Peptide: 1333 pg/mL (05-19-20 @ 08:20)      Hemoglobin A1C   Thyroid      RADIOLOGY:  -CXR:  < from: Xray Chest 1 View- PORTABLE-Urgent (05.19.20 @ 08:39) >    Impression:      No radiographic evidence of acute cardiopulmonary disease.    Mild bibasilar atelectasis.    < end of copied text >        ECG:  < from: 12 Lead ECG (05.19.20 @ 07:53) >    Diagnosis Line AV dual-paced rhythm  Abnormal ECG    < end of copied text >    Telemetry:  none

## 2020-05-20 NOTE — PROGRESS NOTE ADULT - ATTENDING COMMENTS
Agree with resident note with following exceptions    Physical Exam  General: NAD  Head: Normocephalic, atraumatic  HEENT: anicteric  Respiratory: CTA b/L  CVS: RRR, normal s1 s2. left sided AICD, no erythema, nontender   Extremities: No clubbing, cyanosis, edema.  Skin: no rashes or lesions  Neuro: no motor deficits  Psych: AAOX3, normal affect      #chest pain likely due to sustained Vtach (~20sec terminate with ATP)  #episodes of NSVT  ep on board- c/w amio, mixeletine started, geronimo EKG to monitor QTc  cardio eval for ischemic w/u  2decho    #covid 19 positive  no hypoxia, o2 sat 98% on RA  supportive care    #CKD stage 3b- stable  monitor Scr    #heart failure, (likely HFrEF vs HFpEF)  #CAD  #PAF  c/w tosemide, entresto, entresto, spironolactone, metoprolol  c/w xarelto  2decho    #normocytic anemia likely ACD related to kidney disease- monitored  goal Hgb >8    vte ppx: xarelto  full code    Spectra: [id number]

## 2020-05-20 NOTE — PROGRESS NOTE ADULT - ASSESSMENT
71 yo M with PMHx of MI (2005), CAD s/p PCI x2 (2005), defibrillator implantation in 2011 presents with chest discomfort this morning associated with lightheadedness.     #Chest discomfort 2/2 NSVT, sustained VT x 20 seconds   Device interrogation: NSVT, sustained VT x20 seconds   Cont Amio 200 mg Q12h (patient was supposed to go from amio 200 BID to 200 QD on 5/20)  Start Mexiletine 150mg Q8h   2D Echo ordered   Cardio consult for ischemic workup placed  Daily EKG to assess QTc  Monitor electrolytes - Mg >2, K >4     #?HF unknown if reserved or preserved EF   2D echo   continue entresto, spironolactone torsemide, metoprolol     #Acute kidney injury vs CKD  Cr 1.8 on admission  Will attempt to get baseline   Avoid nephrotoxins    #Paroxysmal Afib, rate-controlled   continue xarelto, metoprolol    #CAD s/p PCIx2   continue xarelto, atorvastatin, BB, entresto     #COVID-19 positive, asymptomatic   CXR - bibasilar atelectasis, encourage incentive spirometry   no signs of respiratory distress  Procal 0.06, CRP 0.87, D-dimer 211    #GI ppx: not indicated   #DVT ppx: On xarelto   #Activity: as tolerated   #Diet: DASH/TLC   #Dispo: From Home 69 yo M with PMHx of MI (2005), CAD s/p PCI x2 (2005), defibrillator implantation in 2011 presents with chest discomfort this morning associated with lightheadedness.     #Chest discomfort 2/2 NSVT, sustained VT x 20 seconds   Device interrogation: NSVT, sustained VT x20 seconds   Cont Amio 200 mg Q12h (patient was supposed to go from amio 200 BID to 200 QD on 5/20)  Start Mexiletine 150mg Q8h   2D Echo ordered   Cardio consult for ischemic workup placed  Daily EKG to assess QTc  Monitor electrolytes - Mg >2, K >4     #?HF unknown if reserved or preserved EF   2D echo   continue entresto, spironolactone torsemide, metoprolol     #Chronic Kidney Disease 3b, stable  Last Cr at PMD's office 2.0 (2/2020)  Cr 1.8 on admission   Avoid nephrotoxins    #Paroxysmal Afib, rate-controlled   continue xarelto, metoprolol    #CAD s/p PCIx2   continue xarelto, atorvastatin, BB, entresto     #COVID-19 positive, asymptomatic   CXR - bibasilar atelectasis, encourage incentive spirometry   no signs of respiratory distress  Procal 0.06, CRP 0.87, D-dimer 211    #GI ppx: not indicated   #DVT ppx: On xarelto   #Activity: as tolerated   #Diet: DASH/TLC   #Dispo: From Home

## 2020-05-20 NOTE — PROGRESS NOTE ADULT - SUBJECTIVE AND OBJECTIVE BOX
SUBJECTIVE:    Patient is a 70y old Male who presents with a chief complaint of pacemaker malfunction (19 May 2020 10:38)    Currently admitted to medicine with the primary diagnosis of Defibrillator discharge     Today is hospital day 1d. This morning he is resting comfortably in bed and reports no new issues or overnight events. Eating breakfast, eager to go home soon.    PAST MEDICAL & SURGICAL HISTORY  Pacemaker  HTN (hypertension)    SOCIAL HISTORY:  Negative for smoking/alcohol/drug use.     ALLERGIES:  Allergy Status Unknown    MEDICATIONS:  STANDING MEDICATIONS  aMIOdarone    Tablet 200 milliGRAM(s) Oral two times a day  atorvastatin 80 milliGRAM(s) Oral at bedtime  chlorhexidine 4% Liquid 1 Application(s) Topical <User Schedule>  metoprolol succinate ER 25 milliGRAM(s) Oral daily  mexiletine 150 milliGRAM(s) Oral every 8 hours  rivaroxaban 15 milliGRAM(s) Oral daily  sacubitril 24 mG/valsartan 26 mG 1 Tablet(s) Oral two times a day  spironolactone 25 milliGRAM(s) Oral daily  torsemide 100 milliGRAM(s) Oral daily    PRN MEDICATIONS    VITALS:   T(F): 96.8  HR: 70  BP: 104/58  RR: 18  SpO2: 98%    LABS:                        10.6   9.63  )-----------( 229      ( 19 May 2020 08:20 )             33.8     05-19    137  |  98  |  31<H>  ----------------------------<  142<H>  3.8   |  26  |  1.8<H>    Ca    9.1      19 May 2020 08:20  Mg     2.3     05-19    TPro  7.7  /  Alb  4.2  /  TBili  0.3  /  DBili  x   /  AST  14  /  ALT  8   /  AlkPhos  61  05-19              CARDIAC MARKERS ( 19 May 2020 08:20 )  x     / <0.01 ng/mL / x     / x     / x          RADIOLOGY:    PHYSICAL EXAM:  GEN: No acute distress  LUNGS: Clear to auscultation bilaterally   HEART: S1/S2 present. RRR.   ABD: Soft, non-tender, non-distended. Bowel sounds present  EXT: NC/NC/NE/2+PP/ABAD  NEURO: AAOX3

## 2020-05-20 NOTE — CHART NOTE - NSCHARTNOTEFT_GEN_A_CORE
PREOPERATIVE DAY OF PROCEDURE EVALUATION:  I have personally seen and examined the patient.  I agree with the history and physical which I have reviewed and noted any changes below.  (Signed electronically by Dr. Jed Cleveland)  05-20-20 @ 17:21            Pre cath note:    indication:  [ ] STEMI                [ ] NSTEMI                 [ ] Acute coronary syndrome                     [ ]Unstable Angina   [ ] high risk  [ ] intermediate risk  [ ] low risk                     [ ] Stable Angina     non-invasive testing:                          Date:                     result: [ ] high risk  [ ] intermediate risk  [ ] low risk    Monomorphic VT    Anti- Anginal medications:                    [ ] not used                       [x ] used                   [ ] not used but strong indication not to use    Ejection Fraction                   [ ] <29            [x ] 30-39%   [ ] 40-49%     [ ]>50%    CHF                   [ ] active (within last 14 days on meds   [x ] Chronic (on meds but no exacerbation)    COPD                   [ ] mild (on chronic bronchodilators)  [ ] moderate (on chronic steroid therapy)      [ ] severe (indication for home O2 or PACO2 >50)    Other risk factors:                       [x ] Previous MI                     [ ] CVA/ stroke                    [ ] carotid stent/ CEA                    [x ] PVD/PAD- (arterial aneurysm, non-palpable pulses, tortuous vessel with inability to insert catheter, infra-renal dissection, renal or subclavian artery stenosis)                    [ ] diabetic                    [ ] previous CABG                    [x ] Renal Failure                           10.6   9.63  )-----------( 229      ( 19 May 2020 08:20 )             33.8     05-20    140  |  101  |  26<H>  ----------------------------<  99  4.5   |  27  |  1.8<H>    Ca    9.2      20 May 2020 10:59  Mg     2.5     05-20    TPro  7.7  /  Alb  4.2  /  TBili  0.3  /  DBili  x   /  AST  14  /  ALT  8   /  AlkPhos  61  05-19

## 2020-05-21 LAB
ANION GAP SERPL CALC-SCNC: 16 MMOL/L — HIGH (ref 7–14)
BUN SERPL-MCNC: 24 MG/DL — HIGH (ref 10–20)
CALCIUM SERPL-MCNC: 8.5 MG/DL — SIGNIFICANT CHANGE UP (ref 8.5–10.1)
CHLORIDE SERPL-SCNC: 102 MMOL/L — SIGNIFICANT CHANGE UP (ref 98–110)
CO2 SERPL-SCNC: 24 MMOL/L — SIGNIFICANT CHANGE UP (ref 17–32)
CREAT SERPL-MCNC: 1.6 MG/DL — HIGH (ref 0.7–1.5)
GLUCOSE SERPL-MCNC: 83 MG/DL — SIGNIFICANT CHANGE UP (ref 70–99)
HCT VFR BLD CALC: 30.5 % — LOW (ref 42–52)
HGB BLD-MCNC: 9.1 G/DL — LOW (ref 14–18)
MAGNESIUM SERPL-MCNC: 2.2 MG/DL — SIGNIFICANT CHANGE UP (ref 1.8–2.4)
MCHC RBC-ENTMCNC: 24.1 PG — LOW (ref 27–31)
MCHC RBC-ENTMCNC: 29.8 G/DL — LOW (ref 32–37)
MCV RBC AUTO: 80.9 FL — SIGNIFICANT CHANGE UP (ref 80–94)
NRBC # BLD: 0 /100 WBCS — SIGNIFICANT CHANGE UP (ref 0–0)
PLATELET # BLD AUTO: 215 K/UL — SIGNIFICANT CHANGE UP (ref 130–400)
POTASSIUM SERPL-MCNC: 3.9 MMOL/L — SIGNIFICANT CHANGE UP (ref 3.5–5)
POTASSIUM SERPL-SCNC: 3.9 MMOL/L — SIGNIFICANT CHANGE UP (ref 3.5–5)
RBC # BLD: 3.77 M/UL — LOW (ref 4.7–6.1)
RBC # FLD: 15.4 % — HIGH (ref 11.5–14.5)
SODIUM SERPL-SCNC: 142 MMOL/L — SIGNIFICANT CHANGE UP (ref 135–146)
WBC # BLD: 8.37 K/UL — SIGNIFICANT CHANGE UP (ref 4.8–10.8)
WBC # FLD AUTO: 8.37 K/UL — SIGNIFICANT CHANGE UP (ref 4.8–10.8)

## 2020-05-21 PROCEDURE — 99233 SBSQ HOSP IP/OBS HIGH 50: CPT

## 2020-05-21 PROCEDURE — 99232 SBSQ HOSP IP/OBS MODERATE 35: CPT

## 2020-05-21 RX ORDER — MEXILETINE HYDROCHLORIDE 150 MG/1
1 CAPSULE ORAL
Qty: 90 | Refills: 0
Start: 2020-05-21 | End: 2020-06-19

## 2020-05-21 RX ORDER — RIVAROXABAN 15 MG-20MG
15 KIT ORAL
Refills: 0 | Status: DISCONTINUED | OUTPATIENT
Start: 2020-05-21 | End: 2020-05-22

## 2020-05-21 RX ORDER — ASPIRIN/CALCIUM CARB/MAGNESIUM 324 MG
1 TABLET ORAL
Qty: 0 | Refills: 0 | DISCHARGE
Start: 2020-05-21 | End: 2020-05-28

## 2020-05-21 RX ORDER — CLOPIDOGREL BISULFATE 75 MG/1
1 TABLET, FILM COATED ORAL
Qty: 0 | Refills: 0 | DISCHARGE
Start: 2020-05-21

## 2020-05-21 RX ADMIN — Medication 25 MILLIGRAM(S): at 06:31

## 2020-05-21 RX ADMIN — SACUBITRIL AND VALSARTAN 1 TABLET(S): 24; 26 TABLET, FILM COATED ORAL at 17:15

## 2020-05-21 RX ADMIN — AMIODARONE HYDROCHLORIDE 200 MILLIGRAM(S): 400 TABLET ORAL at 17:15

## 2020-05-21 RX ADMIN — CLOPIDOGREL BISULFATE 75 MILLIGRAM(S): 75 TABLET, FILM COATED ORAL at 11:43

## 2020-05-21 RX ADMIN — RIVAROXABAN 15 MILLIGRAM(S): KIT at 15:23

## 2020-05-21 RX ADMIN — Medication 81 MILLIGRAM(S): at 11:43

## 2020-05-21 RX ADMIN — MEXILETINE HYDROCHLORIDE 150 MILLIGRAM(S): 150 CAPSULE ORAL at 15:23

## 2020-05-21 RX ADMIN — MEXILETINE HYDROCHLORIDE 150 MILLIGRAM(S): 150 CAPSULE ORAL at 20:58

## 2020-05-21 RX ADMIN — ATORVASTATIN CALCIUM 80 MILLIGRAM(S): 80 TABLET, FILM COATED ORAL at 20:58

## 2020-05-21 RX ADMIN — MEXILETINE HYDROCHLORIDE 150 MILLIGRAM(S): 150 CAPSULE ORAL at 06:31

## 2020-05-21 RX ADMIN — AMIODARONE HYDROCHLORIDE 200 MILLIGRAM(S): 400 TABLET ORAL at 06:31

## 2020-05-21 NOTE — PROGRESS NOTE ADULT - ASSESSMENT
EP: Dr. Schmidt    69 yo M with history of CAD s/p PCI (LAD 2 years ago), BiV ICD, paroxysmal AFib on Xarelto and Amio. Pt with recent episodes of VT but has been refusing ablation. He presented to the ED c/o "not feeling well." Interrogation revealed 2 episodes of NSVT (17 sec and 20 sec). He received ATP for one of the episodes. Pt denied chest pain. Now s/p cardiac cath on 5/20.     Impression:  NSVT, POD #1 cardiac cath ANYA x2 to RCA and ANYA x1 to mLAD.   Sustained VT on Interrogation x 20 sec (terminated by ATP)  Hx CAD  Covid +    Recommend  - Cont Amio 200 BID for total of 2 weeks f/b 200 mg daily  - Cont mexiletine 150 mg Q 8 and Toprol XL 25 mg daily  - Cont current management  - Monitor on tele  - F/U echo  - ID consult for asymptomatic COVID + positive  - Monitor lytes and keep K>4 and Mg>2 .  - Please re-call if needed.  - F/u as outpatient with Dr. Schmidt in 3-4 weeks EP: Dr. Schmidt    69 yo M with history of CAD s/p PCI (LAD 2 years ago), BiV ICD, paroxysmal AFib on Xarelto and Amio. Pt with recent episodes of VT but has been refusing ablation. He presented to the ED c/o "not feeling well." Interrogation revealed 2 episodes of NSVT (17 sec and 20 sec). He received ATP for one of the episodes. Pt denied chest pain. Now s/p cardiac cath on 5/20.     Impression:  NSVT, POD #1 cardiac cath NAYA x2 to RCA and ANYA x1 to mLAD.   Sustained VT on Interrogation x 20 sec (terminated by ATP)  Hx CAD  Covid +    Recommend  - Cont Amio 200 BID for total of 2 weeks f/b 200 mg daily  - Cont mexiletine 150 mg Q 8 and Toprol XL 25 mg daily  - Monitor on tele  - F/U echo  - ID consult for asymptomatic COVID + positive  - Monitor lytes and keep K>4 and Mg>2 .  - Please re-call if needed.  - F/u as outpatient with Dr. Schmidt in 3-4 weeks

## 2020-05-21 NOTE — PROGRESS NOTE ADULT - SUBJECTIVE AND OBJECTIVE BOX
Cardiology Follow up    RADHA, JAN   70y Male  PAST MEDICAL & SURGICAL HISTORY:  Pacemaker  HTN (hypertension)       HPI:  69 yo M with PMHx of MI (2005), CAD s/p PCI x2 (2005), PAF on xarelto, AICD implantation in 2011, ?CHF presents with chest discomfort this morning associated with lightheadedness. He is being worked up for some arrhythmia - unknown to patient-  by his EP doctor in [place]. His dosage of amiodarone was recently increased.    In ED, patient was hemodynamically stable, asymptomatic wanting to sign out AMA. Swabbed for COVID, was positive. Seen by EP - device interrogated, showed NSVT, sustained VT x 20 seconds.  After discussion with his primary cardiologist, patient decided to stay for further work-up. Will admit to tele with possible cath. (19 May 2020 10:38)    Allergies    Allergy Status Unknown    Intolerances      Patient without complaints. Pt ambulated without issues/symptoms  Denies CP, SOB, palpitations, or dizziness  No events on telemetry overnight    Vital Signs Last 24 Hrs  T(C): 37 (21 May 2020 06:57), Max: 37 (21 May 2020 06:57)  T(F): 98.6 (21 May 2020 06:57), Max: 98.6 (21 May 2020 06:57)  HR: 69 (21 May 2020 06:57) (69 - 73)  BP: 93/53 (21 May 2020 06:57) (87/53 - 114/69)  BP(mean): --  RR: 18 (21 May 2020 07:57) (18 - 18)  SpO2: 95% (21 May 2020 07:57) (95% - 100%)    MEDICATIONS  (STANDING):  aMIOdarone    Tablet 200 milliGRAM(s) Oral two times a day  aspirin enteric coated 81 milliGRAM(s) Oral daily  atorvastatin 80 milliGRAM(s) Oral at bedtime  chlorhexidine 4% Liquid 1 Application(s) Topical <User Schedule>  clopidogrel Tablet 75 milliGRAM(s) Oral daily  metoprolol succinate ER 25 milliGRAM(s) Oral daily  mexiletine 150 milliGRAM(s) Oral every 8 hours  sacubitril 24 mG/valsartan 26 mG 1 Tablet(s) Oral two times a day  spironolactone 25 milliGRAM(s) Oral daily  torsemide 100 milliGRAM(s) Oral daily    MEDICATIONS  (PRN):      REVIEW OF SYSTEMS:          CONSTITUTIONAL: No weakness, fevers or chills          EYES/ENT: No visual changes;  No vertigo or throat pain           NECK: No pain or stiffness          RESPIRATORY: No cough, wheezing, hemoptysis          CARDIOVASCULAR: no pain, no GOMEZ, no palpitations           GASTROINTESTINAL: No abdominal or epigastric pain. No nausea, vomiting, or hematemesis;           GENITOURINARY: No dysuria, frequency or hematuria          NEUROLOGICAL: No numbness or weakness          SKIN: No itching, rashes    PHYSICAL EXAM:           CONSTITUTIONAL: Well-developed; well-nourished; in no acute distress  	SKIN: warm, dry  	HEAD: Normocephalic; atraumatic  	EYES: PERRL.  	ENT: No nasal discharge, airway clear, mucous membranes moist  	NECK: Supple; non tender.  	CARD: +S1, +S2, no murmurs, gallops, or rubs. (Regular) rate and rhythm    	RESP: No wheezes, rales or rhonchi. CTA B/L  	ABD: soft ntnd, + BS x 4 quadrants  	EXT: moves all extremities,  no clubbing, cyanosis or edema  	NEURO: Alert and oriented x3, no focal deficits          PSYCH: Cooperative, appropriate          VASCULAR:  + Rad / + PTs / + DPs          EXTREMITY:  	   Right Radial: Dressing removed + pulses, , access site soft, no hematoma, no pain, no numbness, no signs and symptoms of infection             ECG: Ventricular Rate 70 BPM    Atrial Rate 36 BPM    QRS Duration 208 ms    Q-T Interval 510 ms    QTC Calculation(Bezet) 550 ms    R Axis -59 degrees    T Axis 107 degrees    Diagnosis Line AV dual-paced rhythm  Abnormal ECG    Confirmed by DELMAR DELA CRUZ MD ([id number]) on 5/19/2020 10:19:32 AM                                                               LABS:                        9.1    8.37  )-----------( 215      ( 21 May 2020 07:04 )             30.5     05-21    142  |  102  |  24<H>  ----------------------------<  83  3.9   |  24  |  1.6<H>    Ca    8.5      21 May 2020 07:04  Mg     2.2     05-21          Magnesium, Serum: 2.2 mg/dL [1.8 - 2.4] (05-21-20 @ 07:04)      A/P:  I discussed the case with Cardiologist Dr. Cleveland and recommend the following:    S/P PCI mLAD ANYA, ANYA mRCA  	     Continue DAPT (asa 81mg daily x 1 week only, plavix 75mg daily),  B-Blocker, Statin Therapy with prior meds                   resume xarelto today, continue DAPT, stop asa after 1 week and continue with xarelto and plavix                   Patient given 30 day supply of ( Plavix 75 mg daily and 7 day supply of asa 81mg daily ) to take at home                   Patient agreeing to take DAPT for at least one year or as directed by cardiologist                    pavel, ambulate                   monitor access site                    Pt given instructions on importance of taking antiplatelet medication or risk acute stent thrombosis/death                   Post cath instructions, access site care and activity restrictions reviewed with patient                     Discussed with patient to return to hospital if experience chest pain, shortness breath, dizziness and site bleeding                   Aggressive risk factor modification, diet counseling, smoking cessation discussed with patient                       Can discharge patient from cardiac standpoint as discussed with Dr. Cleveland                   Follow up with Cardiology (pt has private cardiologist in [place])in one week.  Instructed to call and make an appointment

## 2020-05-21 NOTE — PROGRESS NOTE ADULT - ASSESSMENT
Patient is a 70y old  Male who presents with a chief complaint of pacemaker malfunction (21 May 2020 12:40)    Lightheadedness secondary to NSVT found to have CAD S/P PCI mLAD ANYA, ANYA mRCA  sp PCI   DAPT (asa 81mg daily x 1 week only, plavix 75mg daily),  B-Blocker, Statin Therapy   appreciate cardio follow up     #Obesity BMI 30 patient needs to see dieitian outpatient for further evaluation     #Suspected CHronic systolic congestive heart failure - on torsemide     # Paroxysmal atrial fibrillation restart xarelto     #CKD 3     #Atelectasis deep breathing     Progress Note Handoff    Pending: cardio clearance for dc     Family discussion: patient verbalized understanding and agreeable to plan of care , plan for cardio clearance before dc     Disposition: Home___

## 2020-05-21 NOTE — PROGRESS NOTE ADULT - SUBJECTIVE AND OBJECTIVE BOX
INTERVAL HPI/OVERNIGHT EVENTS:  Patient s/p cardiac cath received ANYA x2 to RCA and ANYA x1 to mLAD. V paced 100% 69 BPM. No tele events otherwise noted.     MEDICATIONS  (STANDING):  aMIOdarone    Tablet 200 milliGRAM(s) Oral two times a day  aspirin enteric coated 81 milliGRAM(s) Oral daily  atorvastatin 80 milliGRAM(s) Oral at bedtime  chlorhexidine 4% Liquid 1 Application(s) Topical <User Schedule>  clopidogrel Tablet 75 milliGRAM(s) Oral daily  metoprolol succinate ER 25 milliGRAM(s) Oral daily  mexiletine 150 milliGRAM(s) Oral every 8 hours  rivaroxaban 15 milliGRAM(s) Oral with dinner  sacubitril 24 mG/valsartan 26 mG 1 Tablet(s) Oral two times a day  spironolactone 25 milliGRAM(s) Oral daily  torsemide 100 milliGRAM(s) Oral daily    MEDICATIONS  (PRN):      Allergies    Allergy Status Unknown      Vital Signs Last 24 Hrs  T(C): 37 (21 May 2020 06:57), Max: 37 (21 May 2020 06:57)  T(F): 98.6 (21 May 2020 06:57), Max: 98.6 (21 May 2020 06:57)  HR: 69 (21 May 2020 06:57) (69 - 73)  BP: 93/53 (21 May 2020 06:57) (87/53 - 114/69)  BP(mean): --  RR: 18 (21 May 2020 07:57) (18 - 18)  SpO2: 95% (21 May 2020 07:57) (95% - 100%)    05-20-20 @ 07:01  -  05-21-20 @ 07:00  --------------------------------------------------------  IN: 330 mL / OUT: 2925 mL / NET: -2595 mL    05-21-20 @ 07:01  -  05-21-20 @ 13:21  --------------------------------------------------------  IN: 200 mL / OUT: 0 mL / NET: 200 mL      LABS:                        9.1    8.37  )-----------( 215      ( 21 May 2020 07:04 )             30.5     05-21    142  |  102  |  24<H>  ----------------------------<  83  3.9   |  24  |  1.6<H>    Ca    8.5      21 May 2020 07:04  Mg     2.2     05-21            RADIOLOGY & ADDITIONAL TESTS:  < from: 12 Lead ECG (05.19.20 @ 07:53) >  Ventricular Rate 70 BPM    Atrial Rate 36 BPM    QRS Duration 208 ms    Q-T Interval 510 ms    QTC Calculation(Bezet) 550 ms    R Axis -59 degrees    T Axis 107 degrees    Diagnosis Line AV dual-paced rhythm  Abnormal ECG    < end of copied text > INTERVAL HPI/OVERNIGHT EVENTS:  Patient s/p cardiac cath received ANYA x2 to mid RCA and ANYA x1 to mLAD. V paced 100% 69 BPM. No tele events otherwise noted.     MEDICATIONS  (STANDING):  aMIOdarone    Tablet 200 milliGRAM(s) Oral two times a day  aspirin enteric coated 81 milliGRAM(s) Oral daily  atorvastatin 80 milliGRAM(s) Oral at bedtime  chlorhexidine 4% Liquid 1 Application(s) Topical <User Schedule>  clopidogrel Tablet 75 milliGRAM(s) Oral daily  metoprolol succinate ER 25 milliGRAM(s) Oral daily  mexiletine 150 milliGRAM(s) Oral every 8 hours  rivaroxaban 15 milliGRAM(s) Oral with dinner  sacubitril 24 mG/valsartan 26 mG 1 Tablet(s) Oral two times a day  spironolactone 25 milliGRAM(s) Oral daily  torsemide 100 milliGRAM(s) Oral daily    MEDICATIONS  (PRN):      Allergies    Allergy Status Unknown      Vital Signs Last 24 Hrs  T(C): 37 (21 May 2020 06:57), Max: 37 (21 May 2020 06:57)  T(F): 98.6 (21 May 2020 06:57), Max: 98.6 (21 May 2020 06:57)  HR: 69 (21 May 2020 06:57) (69 - 73)  BP: 93/53 (21 May 2020 06:57) (87/53 - 114/69)  BP(mean): --  RR: 18 (21 May 2020 07:57) (18 - 18)  SpO2: 95% (21 May 2020 07:57) (95% - 100%)    Physical exam not performed to limit Covid exposure      05-20-20 @ 07:01  -  05-21-20 @ 07:00  --------------------------------------------------------  IN: 330 mL / OUT: 2925 mL / NET: -2595 mL    05-21-20 @ 07:01  -  05-21-20 @ 13:21  --------------------------------------------------------  IN: 200 mL / OUT: 0 mL / NET: 200 mL      LABS:                        9.1    8.37  )-----------( 215      ( 21 May 2020 07:04 )             30.5     05-21    142  |  102  |  24<H>  ----------------------------<  83  3.9   |  24  |  1.6<H>    Ca    8.5      21 May 2020 07:04  Mg     2.2     05-21        RADIOLOGY & ADDITIONAL TESTS:  < from: 12 Lead ECG (05.19.20 @ 07:53) >  Ventricular Rate 70 BPM    Atrial Rate 36 BPM    QRS Duration 208 ms    Q-T Interval 510 ms    QTC Calculation(Bezet) 550 ms    R Axis -59 degrees    T Axis 107 degrees    Diagnosis Line AV dual-paced rhythm  Abnormal ECG    < end of copied text >

## 2020-05-21 NOTE — PROGRESS NOTE ADULT - ASSESSMENT
71 yo M with PMHx of MI (2005), CAD s/p PCI x2 (2005), defibrillator implantation in 2011 presents with chest discomfort this morning associated with lightheadedness.     #Chest discomfort 2/2 NSVT, sustained VT x 20 seconds   Device interrogation: NSVT, sustained VT x20 seconds   Cont Amio 200 mg Q12h (patient was supposed to go from amio 200 BID to 200 QD on 5/20)  Start Mexiletine 150mg Q8h   2D Echo ordered   Cardio consult for ischemic workup placed  Daily EKG to assess QTc  Monitor electrolytes - Mg >2, K >4     #?HF unknown if reserved or preserved EF   2D echo   continue entresto, spironolactone torsemide, metoprolol     #Chronic Kidney Disease 3b, stable  Last Cr at PMD's office 2.0 (2/2020)  Cr 1.8 on admission   Avoid nephrotoxins    #Paroxysmal Afib, rate-controlled   continue xarelto, metoprolol    #CAD s/p PCIx2   continue xarelto, atorvastatin, BB, entresto     #COVID-19 positive, asymptomatic   CXR - bibasilar atelectasis, encourage incentive spirometry   no signs of respiratory distress  Procal 0.06, CRP 0.87, D-dimer 211    #GI ppx: not indicated   #DVT ppx: On xarelto   #Activity: as tolerated   #Diet: DASH/TLC   #Dispo: From Home 71 yo M with PMHx of MI (2005), CAD s/p PCI x2 (2005), defibrillator implantation in 2011 presents with chest discomfort this morning associated with lightheadedness.     #Chest discomfort 2/2 NSVT, sustained VT x 20 seconds   S/p Cardiac cath 5/20, 70% LAD lesion s/p 1 ANYA, 80% mRCA s/p 2 ANYA  On Aspirin 81mg qd, stop after 1 week  Plavix 75mg qd  Xarelto 15mg qd    #Defibrillator discharge  Device interrogation: NSVT, sustained VT x20 seconds   Cont Amio 200 mg Q12h (patient was supposed to go from amio 200 BID to 200 QD on 5/20)  Start Mexiletine 150mg Q8h (was [facility])  2D Echo ordered   D/c Tele  Monitor electrolytes - Mg >2, K >4     #?HF unknown if reserved or preserved EF   2D echo   continue entresto, spironolactone torsemide, metoprolol     #Chronic Kidney Disease 3b, stable  Last Cr at PMD's office 2.0 (2/2020)  Cr 1.6 today   Avoid nephrotoxins    #Paroxysmal Afib, rate-controlled   continue xarelto, metoprolol    #CAD s/p PCIx2   continue xarelto, atorvastatin, BB, entresto     #COVID-19 positive, asymptomatic   CXR - bibasilar atelectasis, encourage incentive spirometry   no signs of respiratory distress  Procal 0.06, CRP 0.87, D-dimer 211    #GI ppx: not indicated   #DVT ppx: On xarelto   #Activity: as tolerated   #Diet: DASH/TLC   #Dispo: From Home, medically cleared for discharge, can't go home today since wife travelled, anticipated for tomorrow 5/22. Mexiletine sent to the pharmacy. Was given enough supply of Aspirin and Plavix. And he has enough supply of the remaining meds

## 2020-05-21 NOTE — PROGRESS NOTE ADULT - SUBJECTIVE AND OBJECTIVE BOX
SUBJECTIVE:    Patient is a 70y old Male who presents with a chief complaint of pacemaker malfunction (21 May 2020 13:20)    Currently admitted to medicine with the primary diagnosis of Defibrillator discharge     Today is hospital day 2d. This morning he is resting comfortably in bed and reports no new issues or overnight events.     PAST MEDICAL & SURGICAL HISTORY  Pacemaker  HTN (hypertension)    SOCIAL HISTORY:  Negative for smoking/alcohol/drug use.     ALLERGIES:  Allergy Status Unknown    MEDICATIONS:  STANDING MEDICATIONS  aMIOdarone    Tablet 200 milliGRAM(s) Oral two times a day  aspirin enteric coated 81 milliGRAM(s) Oral daily  atorvastatin 80 milliGRAM(s) Oral at bedtime  chlorhexidine 4% Liquid 1 Application(s) Topical <User Schedule>  clopidogrel Tablet 75 milliGRAM(s) Oral daily  metoprolol succinate ER 25 milliGRAM(s) Oral daily  mexiletine 150 milliGRAM(s) Oral every 8 hours  rivaroxaban 15 milliGRAM(s) Oral with dinner  sacubitril 24 mG/valsartan 26 mG 1 Tablet(s) Oral two times a day  spironolactone 25 milliGRAM(s) Oral daily  torsemide 100 milliGRAM(s) Oral daily    PRN MEDICATIONS    VITALS:   T(F): 97.8  HR: 69  BP: 97/52  RR: 18  SpO2: 95%    LABS:                        9.1    8.37  )-----------( 215      ( 21 May 2020 07:04 )             30.5     05-21    142  |  102  |  24<H>  ----------------------------<  83  3.9   |  24  |  1.6<H>    Ca    8.5      21 May 2020 07:04  Mg     2.2     05-21                    RADIOLOGY:    PHYSICAL EXAM:  GEN: No acute distress  LUNGS: Clear to auscultation bilaterally   HEART: S1/S2 present. RRR.   ABD: Soft, non-tender, non-distended. Bowel sounds present  EXT: NC/NC/NE/2+PP/BAAD  NEURO: AAOX3

## 2020-05-21 NOTE — PROGRESS NOTE ADULT - REASON FOR ADMISSION
pacemaker malfunction

## 2020-05-22 VITALS
RESPIRATION RATE: 18 BRPM | TEMPERATURE: 97 F | OXYGEN SATURATION: 99 % | DIASTOLIC BLOOD PRESSURE: 56 MMHG | SYSTOLIC BLOOD PRESSURE: 117 MMHG | HEART RATE: 63 BPM

## 2020-05-22 PROCEDURE — 99239 HOSP IP/OBS DSCHRG MGMT >30: CPT

## 2020-05-22 RX ADMIN — CLOPIDOGREL BISULFATE 75 MILLIGRAM(S): 75 TABLET, FILM COATED ORAL at 13:17

## 2020-05-22 RX ADMIN — SACUBITRIL AND VALSARTAN 1 TABLET(S): 24; 26 TABLET, FILM COATED ORAL at 05:39

## 2020-05-22 RX ADMIN — SPIRONOLACTONE 25 MILLIGRAM(S): 25 TABLET, FILM COATED ORAL at 05:39

## 2020-05-22 RX ADMIN — MEXILETINE HYDROCHLORIDE 150 MILLIGRAM(S): 150 CAPSULE ORAL at 05:39

## 2020-05-22 RX ADMIN — Medication 81 MILLIGRAM(S): at 13:17

## 2020-05-22 RX ADMIN — Medication 25 MILLIGRAM(S): at 05:39

## 2020-05-22 RX ADMIN — Medication 100 MILLIGRAM(S): at 05:39

## 2020-05-22 RX ADMIN — AMIODARONE HYDROCHLORIDE 200 MILLIGRAM(S): 400 TABLET ORAL at 05:39

## 2020-05-22 NOTE — DISCHARGE NOTE NURSING/CASE MANAGEMENT/SOCIAL WORK - PATIENT PORTAL LINK FT
You can access the FollowMyHealth Patient Portal offered by [facility] by registering at the following website: [email or web address]. By joining [employer]’s FollowMyHealth portal, you will also be able to view your health information using other applications (apps) compatible with our system.

## 2020-05-22 NOTE — DISCHARGE NOTE PROVIDER - CARE PROVIDER_API CALL
Dr. Morton,   Cardio Electrophys. - known to patient  Phone: (   )    -  Fax: (   )    -  Follow Up Time: 1 week

## 2020-05-22 NOTE — DISCHARGE NOTE PROVIDER - NSDCMRMEDTOKEN_GEN_ALL_CORE_FT
amiodarone 200 mg oral tablet: 1 tab(s) orally 2 times a day  Aspi-Cor 81 mg oral delayed release tablet: 1 tab(s) orally once a day  atorvastatin 80 mg oral tablet: 1 tab(s) orally once a day  Entresto 24 mg-26 mg oral tablet: 1 tab(s) orally 2 times a day  metoprolol succinate 25 mg oral capsule, extended release: 1 cap(s) orally once a day  mexiletine 150 mg oral capsule: 1 cap(s) orally every 8 hours  Plavix 75 mg oral tablet: 1 tab(s) orally once a day  spironolactone 25 mg oral tablet: 1 tab(s) orally once a day  torsemide 100 mg oral tablet: 1 tab(s) orally once a day  Xarelto 15 mg oral tablet: 1 tab(s) orally once a day (in the evening)

## 2020-05-22 NOTE — DISCHARGE NOTE PROVIDER - HOSPITAL COURSE
71 yo M with PMHx of MI (2005), CAD s/p PCI x2 (2005), PAF on xarelto, AICD implantation in 2011, ?CHF presents with chest discomfort this morning associated with lightheadedness. He is being worked up for some arrhythmia - unknown to patient-  by his EP doctor in [place]. His dosage of amiodarone was recently increased.        In ED, patient was hemodynamically stable, asymptomatic wanting to sign out AMA. Swabbed for COVID, was positive. Seen by EP - device interrogated, showed NSVT, sustained VT x 20 seconds.  After discussion with his primary cardiologist, patient decided to stay for further work-up. Patient had a cardiac cath on 5/20 with placement of stents. Started on ASA/plavix as well as mexiletine for the NSVT. Patient to be discharged with cardio f/u.

## 2020-05-22 NOTE — CHART NOTE - NSCHARTNOTEFT_GEN_A_CORE
<<<RESIDENT DISCHARGE NOTE>>>     CASS GARCIA  MRN-[id number]    VITAL SIGNS:  T(F): 98 (05-22-20 @ 05:23), Max: 98.5 (05-21-20 @ 20:44)  HR: 73 (05-22-20 @ 05:23)  BP: 99/59 (05-22-20 @ 05:23)  SpO2: 100% (05-21-20 @ 20:44)  Weight (kg): 92 (05-21-20 @ 12:40)  BMI (kg/m2): 30 (05-21-20 @ 12:40)    PHYSICAL EXAMINATION:  General: Overweight  M moving comfortably around the unit, no O2, eating well  Pulmonary: Lungs clear to auscultation  Cardiovascular: S1S2 regular rate/rhythm, no significant murmurs appreciated  Gastrointestinal/Abdomen & Pelvis: soft NTND  Neurologic/Motor: non-focal    TEST RESULTS:                        9.1    8.37  )-----------( 215      ( 21 May 2020 07:04 )             30.5       05-21    142  |  102  |  24<H>  ----------------------------<  83  3.9   |  24  |  1.6<H>    Ca    8.5      21 May 2020 07:04  Mg     2.2     05-21        FINAL DISCHARGE INTERVIEW:  Resident(s) Present: (Name:______Wing_______)    DISCHARGE MEDICATION RECONCILIATION  reviewed with Attending (Name:____Noemi_______)    DISPOSITION:   [x  ] Home,    [  ] Home with Visiting Nursing Services,   [    ]  SNF/ NH,    [   ] Acute Rehab (4A),   [   ] Other (Specify:_________)

## 2020-05-22 NOTE — DISCHARGE NOTE PROVIDER - PROVIDER TOKENS
FREE:[LAST:[Dr. Morton],PHONE:[(   )    -],FAX:[(   )    -],ADDRESS:[Cardio Electrophys. - known to patient],FOLLOWUP:[1 week]]

## 2020-05-22 NOTE — DISCHARGE NOTE PROVIDER - NSDCFUADDAPPT_GEN_ALL_CORE_FT
EP: Dr. Morton - please make an appointment on discharge for this doctor. Tell them you were in the hospital, bring these documents with you.     Stop aspirin on 5/28  Switch amiodarone to 200mg once daily on 6/3

## 2020-05-22 NOTE — DISCHARGE NOTE PROVIDER - NSDCCPCAREPLAN_GEN_ALL_CORE_FT
PRINCIPAL DISCHARGE DIAGNOSIS  Diagnosis: Defibrillator discharge  Assessment and Plan of Treatment: You presented with firing from your defibrillator and found to have a sustained ventricular tachycardia on your monitor.  - Continue with all new medications including mexiletine  - continue amiodarone TWICE A DAY from 5/19-6/2 ; then switch to ONCE A DAY on 6/3.  - Please follow up with your cardiologist in [place].  - Please follow up with from your provider for EP in [place].  If you have any worsening cheset pain, palpitations, difficulty breathing or other similar symptoms - do NOT delay, seek medical attention.      SECONDARY DISCHARGE DIAGNOSES  Diagnosis: Coronary artery disease  Assessment and Plan of Treatment: You had a cardiac cath on 5/20/2020.   IMPLANTS: NAYA x2 in mRCA, ANYA x1 in mLAD - total of 3 stents placed  - Please continue xarelto and plavix indefinitely. it is CRITICAL that you do not miss these doses.   - Continue aspirin from 5/20 to 5/27 (1 week) then STOP aspirin.  - Make an appointment on discharge with your outpatient cardiologist.  If you have any worsening cheset pain, palpitations, difficulty breathing or other similar symptoms - do NOT delay, seek medical attention.  - Please follow up wtihy your primary for all other care PRINCIPAL DISCHARGE DIAGNOSIS  Diagnosis: Defibrillator discharge  Assessment and Plan of Treatment: You presented with firing from your defibrillator and found to have a sustained ventricular tachycardia on your monitor.  - Continue with all new medications including mexiletine  - continue amiodarone TWICE A DAY from 5/19-6/2 ; then switch to ONCE A DAY on 6/3.  - Please follow up with your cardiologist in [place].  - Please follow up with from your provider for EP in [place].  If you have any worsening cheset pain, palpitations, difficulty breathing or other similar symptoms - do NOT delay, seek medical attention.      SECONDARY DISCHARGE DIAGNOSES  Diagnosis: COVID-19 virus detected  Assessment and Plan of Treatment: You were COVID POS while in hospital.  - You were asymptomatic, requiring no oxygen.   - Upon returning home, continue social isolation as feasible. Consider staying in a separate room. Use a separate bathroom if possible. Keep 6 feet away and minimize prolonged interaction.  - Keep surfaces clean and wiped down at home.  - You can use Tylenol for fevers (avoid NSAIDs such as motrin).   - If you develop any of the following: WORSENING SHORTNESS OF BREATH, high fevers, non-stop diarrhea or vomiting or other concerning symptoms - seek immediate medical attention. Do not wait. Call 911 if you need emergent help.  - Follow up with your primary.      Diagnosis: Coronary artery disease  Assessment and Plan of Treatment: You had a cardiac cath on 5/20/2020.   IMPLANTS: ANYA x2 in mRCA, ANYA x1 in mLAD - total of 3 stents placed  - Please continue xarelto and plavix indefinitely. it is CRITICAL that you do not miss these doses.   - Continue aspirin from 5/20 to 5/27 (1 week) then STOP aspirin.  - Make an appointment on discharge with your outpatient cardiologist.  If you have any worsening cheset pain, palpitations, difficulty breathing or other similar symptoms - do NOT delay, seek medical attention.  - Please follow up wtihy your primary for all other care

## 2020-06-01 DIAGNOSIS — I73.9 PERIPHERAL VASCULAR DISEASE, UNSPECIFIED: ICD-10-CM

## 2020-06-01 DIAGNOSIS — I47.2 VENTRICULAR TACHYCARDIA: ICD-10-CM

## 2020-06-01 DIAGNOSIS — I13.0 HYPERTENSIVE HEART AND CHRONIC KIDNEY DISEASE WITH HEART FAILURE AND STAGE 1 THROUGH STAGE 4 CHRONIC KIDNEY DISEASE, OR UNSPECIFIED CHRONIC KIDNEY DISEASE: ICD-10-CM

## 2020-06-01 DIAGNOSIS — Z79.01 LONG TERM (CURRENT) USE OF ANTICOAGULANTS: ICD-10-CM
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
## 2020-06-01 DIAGNOSIS — N18.3 CHRONIC KIDNEY DISEASE, STAGE 3 (MODERATE): ICD-10-CM
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
## 2020-06-01 DIAGNOSIS — I25.2 OLD MYOCARDIAL INFARCTION: ICD-10-CM

## 2020-06-01 DIAGNOSIS — U07.1 COVID-19: ICD-10-CM

## 2020-06-01 DIAGNOSIS — I25.10 ATHEROSCLEROTIC HEART DISEASE OF NATIVE CORONARY ARTERY WITHOUT ANGINA PECTORIS: ICD-10-CM

## 2020-06-01 DIAGNOSIS — I48.0 PAROXYSMAL ATRIAL FIBRILLATION: ICD-10-CM

## 2020-06-01 DIAGNOSIS — E66.9 OBESITY, UNSPECIFIED: ICD-10-CM

## 2020-06-01 DIAGNOSIS — T82.855A STENOSIS OF CORONARY ARTERY STENT, INITIAL ENCOUNTER: ICD-10-CM

## 2020-06-01 DIAGNOSIS — Z95.810 PRESENCE OF AUTOMATIC (IMPLANTABLE) CARDIAC DEFIBRILLATOR: ICD-10-CM

## 2020-06-01 DIAGNOSIS — J98.11 ATELECTASIS: ICD-10-CM

## 2020-06-01 DIAGNOSIS — I50.22 CHRONIC SYSTOLIC (CONGESTIVE) HEART FAILURE: ICD-10-CM

## 2020-06-01 DIAGNOSIS — D63.1 ANEMIA IN CHRONIC KIDNEY DISEASE: ICD-10-CM

## 2021-05-19 NOTE — ED PROVIDER NOTE - PROGRESS NOTE DETAILS
ATTENDING NOTE: 71 y/o male with hx of MI, ICD in place. This AM while at rest felt "strong tension" associated with light-headedness. States this is the way he feels before the ICD fires. It lasted a few minutes and then subsided. The ICD did not fire. No chest pain, no palpitations, no SOB. No fever, no cough. No recent travel/immobilization/surgery. No calf pain or swelling.   O/E: Constitutional: Non-toxic in appearance. Eyes: PERRL. No pallor, no jaundice. Neck: Neck supple, no meningeal signs. Respiratory: Lungs CTA and equal b/l. Cardio: S1 S2 regular, no murmur. ABD: ABD soft, no tenderness. Extremities: No pedal edema, no calf tenderness. Skin: No skin rash. Neuro: No neurologic deficits.   EKG: AV paced.  Imp: R/O arrhythmia, possible overdrive pacing.  A/P: Cardiac monitor, will check electrolytes. Will consult EP to interrogate device.
patient